# Patient Record
(demographics unavailable — no encounter records)

---

## 2025-02-17 NOTE — DISCUSSION/SUMMARY
[EKG obtained to assist in diagnosis and management of assessed problem(s)] : EKG obtained to assist in diagnosis and management of assessed problem(s) [FreeTextEntry1] : In summary, this is a 57-year-old woman with PMH of polymorphic VT (no known structural heart disease or scar on cMRI, normal coronaries) s/p AICD placement on 10/25/24, HTN, HLD, preDM, anemia, and chronic leg pain. Pt feels unwell on mexilene and complains of chest and muscle pains since starting. She also complains of a cough that she attributes to losartan so will hold for a few days and will see if there is any improvement. She will take amlodipine 5 mg bid for blood pressure while holding the losartan.  She will follow up in 2 weeks to reassess. She will also establish care with general cardiology in the [facility].   I increased the lower rate to MVP 75 to suppress PVC's.  Ms. Gandhi appeared to understand the whole discussion and verbalized that all of his questions were answered to his satisfaction.  Thank you for allowing me to be involved in the care of this pleasant woman. Please feel free to contact me with any questions.

## 2025-02-17 NOTE — CARDIOLOGY SUMMARY
[de-identified] : 2/12/25 Sinus rhythm at 81 bpm  [de-identified] : 1/28/25 TTE 1. Left ventricular cavity is normal in size. Left ventricular systolic function is normal with an ejection fraction of 58 % by 3D. There are no regional wall motion abnormalities seen.  2. Normal right ventricular cavity size and normal right ventricular systolic function.  3. Estimated pulmonary artery systolic pressure is 26 mmHg.  4. No significant valvular disease.  5. No pericardial effusion seen.  6. No prior echocardiogram is available for comparison. [de-identified] : 10/22/24 [facility] no CAD  2.    Diagnostic Coronary Angiography   Indications:               Sustained Ventricular Tachycardia  Polymorphic VT   Diagnostic Conclusions:  The coronary anatomy is normal.   Recommendations:   Admit to CICU  Cardiac MRI and further managemnt as per EPS   General Impressions:  General Diagnostic:      General Diagnostic Impressions   There is no angiographic evidence for coronary artery disease.    Procedure Narrative:  The risks and alternatives of the procedures and conscious sedation were explained to the patient and informed consent was obtained. The patient was brought to the cath lab and placed on the exam table. Access  Left radial artery:  The puncture site was infiltrated with 2% Lidocaine. Vascular access was obtained using modified seldinger technique and a 6 Fr. Radial Glidesheath Slender was advanced into the vessel.    Diagnostic Findings:   Coronary Angiography  The coronary circulation is right dominant.    LM  Left main artery: Angiography shows no disease.    LAD  Left anterior descending artery: Angiography shows no disease.    Patient: YANELIS BERRY         MRN: [id number] Study Date: 10/22/2024   04:29 PM      Page 1 of 3     CX  Circumflex: Angiography shows no disease.    RCA  Right coronary artery: Angiography shows no disease.

## 2025-02-17 NOTE — CARDIOLOGY SUMMARY
[de-identified] : 2/12/25 Sinus rhythm at 81 bpm  [de-identified] : 1/28/25 TTE 1. Left ventricular cavity is normal in size. Left ventricular systolic function is normal with an ejection fraction of 58 % by 3D. There are no regional wall motion abnormalities seen.  2. Normal right ventricular cavity size and normal right ventricular systolic function.  3. Estimated pulmonary artery systolic pressure is 26 mmHg.  4. No significant valvular disease.  5. No pericardial effusion seen.  6. No prior echocardiogram is available for comparison. [de-identified] : 10/22/24 [facility] no CAD  2.    Diagnostic Coronary Angiography   Indications:               Sustained Ventricular Tachycardia  Polymorphic VT   Diagnostic Conclusions:  The coronary anatomy is normal.   Recommendations:   Admit to CICU  Cardiac MRI and further managemnt as per EPS   General Impressions:  General Diagnostic:      General Diagnostic Impressions   There is no angiographic evidence for coronary artery disease.    Procedure Narrative:  The risks and alternatives of the procedures and conscious sedation were explained to the patient and informed consent was obtained. The patient was brought to the cath lab and placed on the exam table. Access  Left radial artery:  The puncture site was infiltrated with 2% Lidocaine. Vascular access was obtained using modified seldinger technique and a 6 Fr. Radial Glidesheath Slender was advanced into the vessel.    Diagnostic Findings:   Coronary Angiography  The coronary circulation is right dominant.    LM  Left main artery: Angiography shows no disease.    LAD  Left anterior descending artery: Angiography shows no disease.    Patient: YANELIS BERRY         MRN: [id number] Study Date: 10/22/2024   04:29 PM      Page 1 of 3     CX  Circumflex: Angiography shows no disease.    RCA  Right coronary artery: Angiography shows no disease.

## 2025-02-17 NOTE — HISTORY OF PRESENT ILLNESS
[FreeTextEntry1] : Referring Physician: Benjie Jimenez MD   Dear :   Ms. Gandhi was seen in the [facility] today. For our records, please allow me to summarize the history and my findings.   This pleasant 57-year-old woman has a history significant for polymorphic VT (no known structural heart disease or scar on cMRI, normal coronaries) s/p ICD placement on 10/25/24, HTN, HLD, preDM, anemia, and chronic leg pain.  She presents today after hospitalization for recurrent VT requiring ICD shocks. 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 She was placed on Lidocaine and transitioned to Bandar. Her PVC burdenw as too low to map and ablate. She complains feels off on the Bandar, and wishes to stop it.    Ms. Laws denies any recent history of chest pain, shortness of breath, palpitations, dizziness, or syncope.

## 2025-02-26 NOTE — DISCUSSION/SUMMARY
[FreeTextEntry1] : In summary, this is a 57-year-old woman with PMH of polymorphic VT (no known structural heart disease or scar on cMRI, normal coronaries) s/p AICD placement on 10/25/24, HTN, HLD, preDM, anemia, and chronic leg pain. Pt states she feels much better after stopping mexiletine and losartan and blood pressure is improved on amlodipine and metoprolol. Last visit, the lower rate was increased to MVP 75 to suppress PVC's.  She returns the office in 6 months.  Ms. Gandhi appeared to understand the whole discussion and verbalized that all of his questions were answered to his satisfaction.  Thank you for allowing me to be involved in the care of this pleasant woman. Please feel free to contact me with any questions. [EKG obtained to assist in diagnosis and management of assessed problem(s)] : EKG obtained to assist in diagnosis and management of assessed problem(s)

## 2025-02-26 NOTE — DISCUSSION/SUMMARY
[FreeTextEntry1] : 57 year old woman with polymorphic VT with no structural heart disease, normal cors, ICD here to establish care #HTN- Patient only on Norvasc and Toprol Off Losartan x 2 weeks bc of cough #VT- Off jaleel FU with Dr. Ham [EKG obtained to assist in diagnosis and management of assessed problem(s)] : EKG obtained to assist in diagnosis and management of assessed problem(s)

## 2025-02-26 NOTE — HISTORY OF PRESENT ILLNESS
[FreeTextEntry1] : 57 year old woman with history of polymorphic VT ( no known structural heart disease or scar- normal cMRI and normal cors), ICD implantation 10/2024, HTN HLD. She was hospitalized for recurrent VT with ICD shocks, transitioned to mexiletine and PVC burden too low to map and ablate. She could not tolerate Jaleel and was dc'd 2/12/25.    Meds: Norvasc 5 mg daily Losartan 100 mg daily (DC'd) Toprol 50 mg daily   TTE 1/28/25:  1. Left ventricular cavity is normal in size. Left ventricular systolic function is normal with an ejection fraction of 58 % by 3D. There are no regional wall motion abnormalities seen.  2. Normal right ventricular cavity size and normal right ventricular systolic function.  3. Estimated pulmonary artery systolic pressure is 26 mmHg.  4. No significant valvular disease.  5. No pericardial effusion seen.  6. No prior echocardiogram is available for comparison.   #HTN- Patient only on Norvasc and Toprol Off Losartan x 2 weeks bc of cough #VT- Off jaleel FU with Dr. Ham

## 2025-02-26 NOTE — HISTORY OF PRESENT ILLNESS
[FreeTextEntry1] : Referring Physician: Evie Davis MD   Dear Dr. Davis:   Ms. Gandhi was seen in the [facility] Electrophysiology [facility] today. For our records, please allow me to summarize the history and my findings.   This pleasant 57-year-old woman has a history significant for polymorphic VT (no known structural heart disease or scar on cMRI, normal coronaries) s/p ICD placement on 10/25/24, HTN, HLD, preDM, anemia, and chronic leg pain.  She had a recent hospitalization for recurrent VT requiring ICD shocks. 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 She was placed on Lidocaine and transitioned to Bandar. Her PVC burden as too low to map and ablate. Last visit on 2/12/25, she did not feel well on the mexiletine, and it was stopped. She also complained of a cough that she attributed to losartan. She states she feels much better after stopping losartan and mexiletine. She is taking amlodipine 5 mg and metoprolol 50 mg daily with improvement in blood pressure readings with home log 120 range.  She is establishing care with cardiology today.    Ms. Laws denies any recent history of chest pain, shortness of breath, palpitations, dizziness, or syncope.

## 2025-02-26 NOTE — CARDIOLOGY SUMMARY
[de-identified] : 2/26/25 Sinus rhythm at 80 bpm  2/12/25 Sinus rhythm at 81 bpm  [de-identified] : 1/28/25 TTE 1. Left ventricular cavity is normal in size. Left ventricular systolic function is normal with an ejection fraction of 58 % by 3D. There are no regional wall motion abnormalities seen.  2. Normal right ventricular cavity size and normal right ventricular systolic function.  3. Estimated pulmonary artery systolic pressure is 26 mmHg.  4. No significant valvular disease.  5. No pericardial effusion seen.  6. No prior echocardiogram is available for comparison. [de-identified] : 10/22/24 [facility] no CAD  2.    Diagnostic Coronary Angiography   Indications:               Sustained Ventricular Tachycardia  Polymorphic VT   Diagnostic Conclusions:  The coronary anatomy is normal.   Recommendations:   Admit to CICU  Cardiac MRI and further managemnt as per EPS   General Impressions:  General Diagnostic:      General Diagnostic Impressions   There is no angiographic evidence for coronary artery disease.    Procedure Narrative:  The risks and alternatives of the procedures and conscious sedation were explained to the patient and informed consent was obtained. The patient was brought to the cath lab and placed on the exam table. Access  Left radial artery:  The puncture site was infiltrated with 2% Lidocaine. Vascular access was obtained using modified seldinger technique and a 6 Fr. Radial Glidesheath Slender was advanced into the vessel.    Diagnostic Findings:   Coronary Angiography  The coronary circulation is right dominant.    LM  Left main artery: Angiography shows no disease.    LAD  Left anterior descending artery: Angiography shows no disease.    Patient: YANELIS BERRY         MRN: [id number] Study Date: 10/22/2024   04:29 PM      Page 1 of 3     CX  Circumflex: Angiography shows no disease.    RCA  Right coronary artery: Angiography shows no disease.

## 2025-05-28 NOTE — REVIEW OF SYSTEMS
[Chest Discomfort] : chest discomfort [Dizziness] : dizziness [Negative] : Heme/Lymph [FreeTextEntry2] : chest pains

## 2025-05-28 NOTE — DISCUSSION/SUMMARY
[EKG obtained to assist in diagnosis and management of assessed problem(s)] : EKG obtained to assist in diagnosis and management of assessed problem(s) [FreeTextEntry1] : 58 year old woman with polymorphic VT with no structural heart disease, normal cors, ICD here for followup #CHest pain- Check CCTA- if normal will consider microvascular testing #HTN- Patient only on Norvasc and Toprol #VT- Off jaleel FU with Dr. Ham

## 2025-05-28 NOTE — HISTORY OF PRESENT ILLNESS
[FreeTextEntry1] : 58 year old woman with history of polymorphic VT ( no known structural heart disease or scar- normal cMRI and normal cors), ICD implantation 10/2024, HTN HLD. She was hospitalized for recurrent VT with ICD shocks, transitioned to mexiletine and PVC burden too low to map and ablate. She could not tolerate Jaleel and was dc'd 2/12/25.   Complaining of intermittent chest pain; hesitant to do microvascular testing  Meds: Norvasc 5 mg daily Toprol 50 mg daily   TTE 1/28/25:  1. Left ventricular cavity is normal in size. Left ventricular systolic function is normal with an ejection fraction of 58 % by 3D. There are no regional wall motion abnormalities seen.  2. Normal right ventricular cavity size and normal right ventricular systolic function.  3. Estimated pulmonary artery systolic pressure is 26 mmHg.  4. No significant valvular disease.  5. No pericardial effusion seen.  6. No prior echocardiogram is available for comparison.  #CHest pain- Check CCTA- if normal will consider microvascular testing #HTN- Patient only on Norvasc and Toprol #VT- Off jaleel FU with Dr. Ham

## 2025-05-28 NOTE — CARDIOLOGY SUMMARY
[de-identified] : 2/26/25 Sinus rhythm at 80 bpm  2/12/25 Sinus rhythm at 81 bpm  5/28/25: simus rhythm at 75bpm with 1st degree AV block  [de-identified] : 1/28/25 TTE 1. Left ventricular cavity is normal in size. Left ventricular systolic function is normal with an ejection fraction of 58 % by 3D. There are no regional wall motion abnormalities seen.  2. Normal right ventricular cavity size and normal right ventricular systolic function.  3. Estimated pulmonary artery systolic pressure is 26 mmHg.  4. No significant valvular disease.  5. No pericardial effusion seen.  6. No prior echocardiogram is available for comparison. [de-identified] : 10/22/24 [facility] no CAD  2.    Diagnostic Coronary Angiography   Indications:               Sustained Ventricular Tachycardia  Polymorphic VT   Diagnostic Conclusions:  The coronary anatomy is normal.   Recommendations:   Admit to CICU  Cardiac MRI and further managemnt as per EPS   General Impressions:  General Diagnostic:      General Diagnostic Impressions   There is no angiographic evidence for coronary artery disease.    Procedure Narrative:  The risks and alternatives of the procedures and conscious sedation were explained to the patient and informed consent was obtained. The patient was brought to the cath lab and placed on the exam table. Access  Left radial artery:  The puncture site was infiltrated with 2% Lidocaine. Vascular access was obtained using modified seldinger technique and a 6 Fr. Radial Glidesheath Slender was advanced into the vessel.    Diagnostic Findings:   Coronary Angiography  The coronary circulation is right dominant.    LM  Left main artery: Angiography shows no disease.    LAD  Left anterior descending artery: Angiography shows no disease.    Patient: YANELIS BERRY         MRN: [id number] Study Date: 10/22/2024   04:29 PM      Page 1 of 3     CX  Circumflex: Angiography shows no disease.    RCA  Right coronary artery: Angiography shows no disease.

## 2025-06-03 NOTE — DISCUSSION/SUMMARY
[EKG obtained to assist in diagnosis and management of assessed problem(s)] : EKG obtained to assist in diagnosis and management of assessed problem(s) [FreeTextEntry1] : In summary, this is a 58-year-old woman with PMH of polymorphic VT (no known structural heart disease or scar on cMRI, normal coronaries) s/p AICD placement on 10/25/24, HTN, HLD, preDM, anemia, and chronic leg pain. Pt states she feels much better after stopping mexiletine and losartan and blood pressure is improved on amlodipine and metoprolol. Last visit, the lower rate was increased to MVP 75 to suppress PVC's.  She returns the office in 6 months.  Ms. Gandhi appeared to understand the whole discussion and verbalized that all of his questions were answered to his satisfaction.  Thank you for allowing me to be involved in the care of this pleasant woman. Please feel free to contact me with any questions.

## 2025-06-03 NOTE — CARDIOLOGY SUMMARY
[de-identified] : 2/26/25 Sinus rhythm at 80 bpm  2/12/25 Sinus rhythm at 81 bpm  5/28/25: simus rhythm at 75bpm with 1st degree AV block  [de-identified] : 1/28/25 TTE 1. Left ventricular cavity is normal in size. Left ventricular systolic function is normal with an ejection fraction of 58 % by 3D. There are no regional wall motion abnormalities seen.  2. Normal right ventricular cavity size and normal right ventricular systolic function.  3. Estimated pulmonary artery systolic pressure is 26 mmHg.  4. No significant valvular disease.  5. No pericardial effusion seen.  6. No prior echocardiogram is available for comparison. [de-identified] : 10/22/24 [facility] no CAD  2.    Diagnostic Coronary Angiography   Indications:               Sustained Ventricular Tachycardia  Polymorphic VT   Diagnostic Conclusions:  The coronary anatomy is normal.   Recommendations:   Admit to CICU  Cardiac MRI and further managemnt as per EPS   General Impressions:  General Diagnostic:      General Diagnostic Impressions   There is no angiographic evidence for coronary artery disease.    Procedure Narrative:  The risks and alternatives of the procedures and conscious sedation were explained to the patient and informed consent was obtained. The patient was brought to the cath lab and placed on the exam table. Access  Left radial artery:  The puncture site was infiltrated with 2% Lidocaine. Vascular access was obtained using modified seldinger technique and a 6 Fr. Radial Glidesheath Slender was advanced into the vessel.    Diagnostic Findings:   Coronary Angiography  The coronary circulation is right dominant.    LM  Left main artery: Angiography shows no disease.    LAD  Left anterior descending artery: Angiography shows no disease.    Patient: YANELIS BERRY         MRN: [id number] Study Date: 10/22/2024   04:29 PM      Page 1 of 3     CX  Circumflex: Angiography shows no disease.    RCA  Right coronary artery: Angiography shows no disease.

## 2025-06-03 NOTE — HISTORY OF PRESENT ILLNESS
[FreeTextEntry1] : Referring Physician: Evie Davis MD   Dear Dr. Davis:   Ms. Gandhi was seen in the [facility] today. For our records, please allow me to summarize the history and my findings.   This pleasant 58-year-old woman has a history significant for polymorphic VT (no known structural heart disease or scar on cMRI, normal coronaries) s/p ICD placement on 10/25/24, HTN, HLD, preDM, anemia, and chronic leg pain.  She had a recent hospitalization for recurrent VT requiring ICD shocks. 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 She was placed on Lidocaine and transitioned to Bandar. Her PVC burden as too low to map and ablate. Last visit on 2/12/25, she did not feel well on the mexiletine, and it was stopped. She also complained of a cough that she attributed to losartan. She states she feels much better after stopping losartan and mexiletine. She is taking amlodipine 5 mg and metoprolol 50 mg daily with improvement in blood pressure readings with home log 120 range. Reports occasional chest discomfort and dizziness. Episodes are short in duration lasting a few minutes. Can happen at rest or with activity. Review of remote monitoring with no events to review.    Ms. Laws denies any recent history of chest pain, shortness of breath, palpitations, dizziness, or syncope.

## 2025-06-03 NOTE — CARDIOLOGY SUMMARY
[de-identified] : 2/26/25 Sinus rhythm at 80 bpm  2/12/25 Sinus rhythm at 81 bpm  5/28/25: simus rhythm at 75bpm with 1st degree AV block  [de-identified] : 1/28/25 TTE 1. Left ventricular cavity is normal in size. Left ventricular systolic function is normal with an ejection fraction of 58 % by 3D. There are no regional wall motion abnormalities seen.  2. Normal right ventricular cavity size and normal right ventricular systolic function.  3. Estimated pulmonary artery systolic pressure is 26 mmHg.  4. No significant valvular disease.  5. No pericardial effusion seen.  6. No prior echocardiogram is available for comparison. [de-identified] : 10/22/24 [facility] no CAD  2.    Diagnostic Coronary Angiography   Indications:               Sustained Ventricular Tachycardia  Polymorphic VT   Diagnostic Conclusions:  The coronary anatomy is normal.   Recommendations:   Admit to CICU  Cardiac MRI and further managemnt as per EPS   General Impressions:  General Diagnostic:      General Diagnostic Impressions   There is no angiographic evidence for coronary artery disease.    Procedure Narrative:  The risks and alternatives of the procedures and conscious sedation were explained to the patient and informed consent was obtained. The patient was brought to the cath lab and placed on the exam table. Access  Left radial artery:  The puncture site was infiltrated with 2% Lidocaine. Vascular access was obtained using modified seldinger technique and a 6 Fr. Radial Glidesheath Slender was advanced into the vessel.    Diagnostic Findings:   Coronary Angiography  The coronary circulation is right dominant.    LM  Left main artery: Angiography shows no disease.    LAD  Left anterior descending artery: Angiography shows no disease.    Patient: YANELIS BERRY         MRN: [id number] Study Date: 10/22/2024   04:29 PM      Page 1 of 3     CX  Circumflex: Angiography shows no disease.    RCA  Right coronary artery: Angiography shows no disease.

## 2025-06-03 NOTE — REVIEW OF SYSTEMS
[Negative] : Heme/Lymph [Chest Discomfort] : chest discomfort [Dizziness] : dizziness [FreeTextEntry2] : chest pains

## 2025-06-03 NOTE — HISTORY OF PRESENT ILLNESS
[FreeTextEntry1] : Referring Physician: Evie Davis MD   Dear Dr. Davis:   Ms. Gandhi was seen in the [facility]rophysiology [facility] today. For our records, please allow me to summarize the history and my findings.   This pleasant 58-year-old woman has a history significant for polymorphic VT (no known structural heart disease or scar on cMRI, normal coronaries) s/p ICD placement on 10/25/24, HTN, HLD, preDM, anemia, and chronic leg pain.  She had a recent hospitalization for recurrent VT requiring ICD shocks. 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 She was placed on Lidocaine and transitioned to Bandar. Her PVC burden as too low to map and ablate. Last visit on 2/12/25, she did not feel well on the mexiletine, and it was stopped. She also complained of a cough that she attributed to losartan. She states she feels much better after stopping losartan and mexiletine. She is taking amlodipine 5 mg and metoprolol 50 mg daily with improvement in blood pressure readings with home log 120 range. Reports occasional chest discomfort and dizziness. Episodes are short in duration lasting a few minutes. Can happen at rest or with activity. Review of remote monitoring with no events to review.    Ms. Laws denies any recent history of chest pain, shortness of breath, palpitations, dizziness, or syncope.